# Patient Record
Sex: FEMALE | Race: OTHER | Employment: UNEMPLOYED | URBAN - METROPOLITAN AREA
[De-identification: names, ages, dates, MRNs, and addresses within clinical notes are randomized per-mention and may not be internally consistent; named-entity substitution may affect disease eponyms.]

---

## 2017-01-24 ENCOUNTER — HOSPITAL ENCOUNTER (OUTPATIENT)
Dept: LAB | Age: 37
Discharge: HOME | End: 2017-01-24
Attending: INTERNAL MEDICINE
Payer: MEDICAID

## 2017-01-24 DIAGNOSIS — E03.9 PRIMARY HYPOTHYROIDISM: ICD-10-CM

## 2017-01-24 PROBLEM — E04.1 THYROID NODULE: Status: ACTIVE | Noted: 2017-01-24

## 2017-01-24 LAB
T4 SERPL-MCNC: 11 UG/DL (ref 4.8–13.9)
TSH SERPL DL<=0.005 MIU/L-ACNC: 3.7 UIU/ML (ref 0.36–3.74)
TSH W FREE THYROID I,TSHELE: 3.7 UIU/ML

## 2017-01-24 PROCEDURE — 36415 COLL VENOUS BLD VENIPUNCTURE: CPT | Performed by: INTERNAL MEDICINE

## 2017-01-24 PROCEDURE — 86376 MICROSOMAL ANTIBODY EACH: CPT | Performed by: INTERNAL MEDICINE

## 2017-01-24 PROCEDURE — 84443 ASSAY THYROID STIM HORMONE: CPT | Performed by: INTERNAL MEDICINE

## 2017-01-24 PROCEDURE — 84436 ASSAY OF TOTAL THYROXINE: CPT | Performed by: INTERNAL MEDICINE

## 2017-01-25 LAB — THYROPEROXIDASE AB SERPL-ACNC: 37 IU/ML (ref 0–34)

## 2017-05-16 PROBLEM — R73.03 PREDIABETES: Status: ACTIVE | Noted: 2017-05-16

## 2017-05-16 PROBLEM — G47.33 OSA ON CPAP: Status: ACTIVE | Noted: 2017-05-16

## 2017-05-16 PROBLEM — Z99.89 OSA ON CPAP: Status: ACTIVE | Noted: 2017-05-16

## 2018-03-30 PROBLEM — E06.3 HASHIMOTO'S THYROIDITIS: Status: ACTIVE | Noted: 2018-03-30

## 2018-05-22 PROBLEM — E66.01 SEVERE OBESITY (BMI 35.0-39.9) WITH COMORBIDITY (HCC): Status: ACTIVE | Noted: 2018-05-22

## 2018-06-05 PROBLEM — Z01.419 WOMEN'S ANNUAL ROUTINE GYNECOLOGICAL EXAMINATION: Status: ACTIVE | Noted: 2018-06-05

## 2019-02-19 PROBLEM — Z30.09 CONTRACEPTIVE EDUCATION: Status: ACTIVE | Noted: 2019-02-19

## 2019-08-27 PROBLEM — Z30.09 CONTRACEPTIVE EDUCATION: Status: RESOLVED | Noted: 2019-02-19 | Resolved: 2019-08-27

## 2019-08-27 PROBLEM — Z30.42 DEPO-PROVERA CONTRACEPTIVE STATUS: Status: ACTIVE | Noted: 2019-08-27

## 2019-08-29 PROBLEM — Z12.4 PAP SMEAR FOR CERVICAL CANCER SCREENING: Status: ACTIVE | Noted: 2018-06-05

## 2020-06-16 ENCOUNTER — HOSPITAL ENCOUNTER (OUTPATIENT)
Dept: PHYSICAL THERAPY | Age: 40
Discharge: HOME | End: 2020-06-16
Attending: SURGERY
Payer: MEDICAID

## 2020-06-16 DIAGNOSIS — E78.2 MIXED HYPERLIPIDEMIA: ICD-10-CM

## 2020-06-16 DIAGNOSIS — E66.01 MORBID OBESITY DUE TO EXCESS CALORIES (HCC): ICD-10-CM

## 2020-06-16 DIAGNOSIS — G47.33 OSA (OBSTRUCTIVE SLEEP APNEA): ICD-10-CM

## 2020-06-16 PROCEDURE — 97161 PT EVAL LOW COMPLEX 20 MIN: CPT

## 2020-06-16 NOTE — THERAPY EVALUATION
Dennise Castrejonsheng  : 1980  Primary: [employer] Medicaid  Secondary:  [address]  at [facility] DONAVAN BASURTO  [facility], [address] [place] [address],[address], [facility] [address]  [phone]   [phone]       OUTPATIENT PHYSICAL [place] Assessment 2020     ICD-10: Treatment Diagnosis: Morbid obesity due to excess calories ([address] [E66.01]  Precautions/Allergies:   Patient has no known allergies. TREATMENT PLAN:  Effective Dates: 2020 TO Today (2020). Frequency/Duration: 1 visit for HEP MEDICAL/REFERRING DIAGNOSIS:  Morbid obesity due to excess calories ([address] [E66.01]  Mixed hyperlipidemia [E78.2]  MATI (obstructive sleep apnea) [G47.33]  BMI 35.0-35.9,adult [Z68.35]   DATE OF ONSET: chronic   REFERRING PHYSICIAN: Simi Le*  MD Orders: Referral to physical therapy  Return MD Appointment: unknown     INITIAL ASSESSMENT:  Ms. Silva Witt presents to physical therapy in preparation for bariatric surgery. She is currently riding her bike or walking 1-2 days/week. She states motivation is her main limitation to exercising. Discussed exercise options and came up with a plan for patient to start [place] body on demand with 21 fix program. Pt verbalizes understanding and importance of exercise. No further PT needs at this time. PROBLEM LIST (Impacting functional limitations):  1. Decreased Activity Tolerance INTERVENTIONS PLANNED: (Treatment may consist of any combination of the following)  1. Therapeutic Exercise/Strengthening     GOALS: (Goals have been discussed and agreed upon with patient.)  Discharge Goals: Time Frame: 1 visit  1. Independent with HEP. OUTCOME MEASURE:   Tool Used: Lower Extremity Functional Scale (LEFS)  Score:  Initial:  Most Recent:  (Date: -- )   Interpretation of Score: 20 questions each scored on a 5 point scale with 0 representing \"extreme difficulty or unable to perform\" and 4 representing \"no difficulty\".   The lower the score, the greater the functional disability. 80/80 represents no disability. Minimal detectable change is 9 points. Total Duration: 35 minutes  PT Patient Time In/Time Out  Time In: 1215  Time Out: 1250    Rehabilitation Potential For Stated Goals: Good  Regarding Dennise Block's therapy, I certify that the treatment plan above will be carried out by a therapist or under their direction. Thank you for this referral,    Isaiah Martin, PT      Referring Physician Signature: Simi Le Fee* _______________________________ Date _____________       PAIN/SUBJECTIVE:   Initial:   010 Post Session:  0/10   HISTORY:   History of Injury/Illness (Reason for Referral): Pt presents to therapy in preparation for bariatric surgery. She is currently riding her bike with her children 1-2x/week and walking 1x/week. She reports difficulty finding motivation to start an exercise routine. Past Medical History/Comorbidities:   Ms. Silva Witt  has a past medical history of Allergic rhinitis, Anxiety, Calculus of kidney, Chills, Depression, Diabetes ([address] GERD (gastroesophageal reflux disease), Headache, HSV-1 (herpes simplex virus 1) infection, Hyperlipidemia, Low energy, Menometrorrhagia, Mixed hyperlipidemia, Obesity, Obstructive sleep apnea on CPAP, MATI on CPAP (2017), Prediabetes (2017), Primary hypothyroidism, Sleep disorder, Snoring, and Urinary incontinence. Ms. Sliva Witt  has a past surgical history that includes hx  section (). Social History/Living Environment:    Lives with 3 children  Prior Level of Function/Work/Activity:  She is currently not working.  She takes care of her 3 kids, ages 13, 5 and 9   Ambulatory/Rehab Services H2 Model Falls Risk Assessment   Risk Factors:       No Risk Factors Identified Ability to Rise from Chair:       (0)  Ability to rise in a single movement   Falls Prevention Plan:       No modifications necessary   Total: (5 or greater = High Risk): 0   ©2010 [facility] [employer]. All Rights Reserved. Elsy Torres Patent #[id number]. Federal Law prohibits the replication, distribution or use without written permission from [facility] [place]   Current Medications:       Current Outpatient Medications:     medroxyPROGESTERone (DEPO-PROVERA) 150 mg/mL injection, 150 mg by IntraMUSCular route once., Disp: , Rfl:     pravastatin (PRAVACHOL) 80 mg tablet, Take 1 Tab by mouth nightly., Disp: 90 Tab, Rfl: 5    metFORMIN (GLUCOPHAGE) 500 mg tablet, Take 1 Tab by mouth two (2) times a day., Disp: 180 Tab, Rfl: 5    levothyroxine (SYNTHROID) 112 mcg tablet, Take 1 Tab by mouth Daily (before breakfast). , Disp: 90 Tab, Rfl: 5   Date Last Reviewed:  6-   Number of Personal Factors/Comorbidities that affect the Plan of Care: 1-2: MODERATE COMPLEXITY   EXAMINATION:   Observation/Orthostatic Postural Assessment: Pt presents to physical therapy in no apparent distress. ROM: shoulder and knee ROM WFL  Strength: UE and LE strength grossly 4+/5  Special Tests: none  Neurological Screen: n/t. Functional Mobility:  Sit to/from Stand: independent. Bed Mobility: independent. Walking on level ground: ambulates with a normalized gait. Body Structures Involved:  1. Joints  2. Muscles Body Functions Affected:  1. Neuromusculoskeletal Activities and Participation Affected:  1.  Community, Social and [facility]   Number of elements (examined above) that affect the Plan of Care: 3: MODERATE COMPLEXITY   CLINICAL PRESENTATION:   Presentation: Stable and uncomplicated: LOW COMPLEXITY   CLINICAL DECISION MAKING:   Use of outcome tool(s) and clinical judgement create a POC that gives a: Clear prediction of patient's progress: LOW COMPLEXITY

## 2020-06-29 ENCOUNTER — HOSPITAL ENCOUNTER (OUTPATIENT)
Dept: GENERAL RADIOLOGY | Age: 40
Discharge: HOME | End: 2020-06-29
Attending: SURGERY
Payer: MEDICAID

## 2020-06-29 DIAGNOSIS — E66.01 MORBID OBESITY DUE TO EXCESS CALORIES (HCC): ICD-10-CM

## 2020-06-29 PROCEDURE — 74011000250 HC RX REV CODE- 250: Performed by: SURGERY

## 2020-06-29 PROCEDURE — 74246 X-RAY XM UPR GI TRC 2CNTRST: CPT

## 2020-06-29 PROCEDURE — 74011000255 HC RX REV CODE- 255: Performed by: SURGERY

## 2020-06-29 RX ADMIN — ANTACID/ANTIFLATULENT 4 G: 380; 550; 10; 10 GRANULE, EFFERVESCENT ORAL at 09:40

## 2020-06-29 RX ADMIN — BARIUM SULFATE 135 ML: 980 POWDER, FOR SUSPENSION ORAL at 09:40

## 2020-06-29 RX ADMIN — BARIUM SULFATE 355 ML: 0.6 SUSPENSION ORAL at 09:44

## 2021-04-29 ENCOUNTER — HOSPITAL ENCOUNTER (OUTPATIENT)
Dept: LAB | Age: 41
Discharge: HOME | End: 2021-04-29
Payer: MEDICAID

## 2021-04-29 DIAGNOSIS — R63.5 WEIGHT GAIN: ICD-10-CM

## 2021-04-29 LAB — CORTIS BS SERPL-MCNC: 0.6 UG/DL

## 2021-04-29 PROCEDURE — 82542 COL CHROMOTOGRAPHY QUAL/QUAN: CPT

## 2021-04-29 PROCEDURE — 82533 TOTAL CORTISOL: CPT

## 2021-04-29 PROCEDURE — 36415 COLL VENOUS BLD VENIPUNCTURE: CPT

## 2021-05-12 LAB — DEXAMETHASONE SERPL-MCNC: 472 NG/DL

## 2021-06-04 ENCOUNTER — HOSPITAL ENCOUNTER (OUTPATIENT)
Dept: LAB | Age: 41
Discharge: HOME | End: 2021-06-04
Payer: MEDICAID

## 2021-06-04 DIAGNOSIS — E03.9 PRIMARY HYPOTHYROIDISM: ICD-10-CM

## 2021-06-04 LAB
T3 SERPL-MCNC: 1.02 NG/ML (ref 0.6–1.81)
T4 FREE SERPL-MCNC: 1.6 NG/DL (ref 0.78–1.46)
TSH W FREE THYROID I,TSHELE: 0.11 UIU/ML (ref 0.36–3.74)

## 2021-06-04 PROCEDURE — 84439 ASSAY OF FREE THYROXINE: CPT

## 2021-06-04 PROCEDURE — 84480 ASSAY TRIIODOTHYRONINE (T3): CPT

## 2021-06-04 PROCEDURE — 36415 COLL VENOUS BLD VENIPUNCTURE: CPT

## 2021-06-04 PROCEDURE — 84443 ASSAY THYROID STIM HORMONE: CPT

## 2021-08-06 PROBLEM — N39.3 SUI (STRESS URINARY INCONTINENCE, FEMALE): Status: ACTIVE | Noted: 2021-08-06

## 2022-03-18 PROBLEM — R73.03 PREDIABETES: Status: ACTIVE | Noted: 2017-05-16

## 2022-03-19 PROBLEM — Z30.42 DEPO-PROVERA CONTRACEPTIVE STATUS: Status: ACTIVE | Noted: 2019-08-27

## 2022-03-19 PROBLEM — Z01.419 ENCOUNTER FOR ANNUAL ROUTINE GYNECOLOGICAL EXAMINATION: Status: ACTIVE | Noted: 2018-06-05

## 2022-03-19 PROBLEM — E66.01 SEVERE OBESITY (BMI 35.0-39.9) WITH COMORBIDITY (HCC): Status: ACTIVE | Noted: 2018-05-22

## 2022-03-19 PROBLEM — Z99.89 OSA ON CPAP: Status: ACTIVE | Noted: 2017-05-16

## 2022-03-19 PROBLEM — G47.33 OSA ON CPAP: Status: ACTIVE | Noted: 2017-05-16

## 2022-03-20 PROBLEM — E04.1 THYROID NODULE: Status: ACTIVE | Noted: 2017-01-24

## 2022-03-20 PROBLEM — N39.3 SUI (STRESS URINARY INCONTINENCE, FEMALE): Status: ACTIVE | Noted: 2021-08-06

## 2022-03-20 PROBLEM — E06.3 HASHIMOTO'S THYROIDITIS: Status: ACTIVE | Noted: 2018-03-30

## 2023-01-03 ENCOUNTER — TRANSCRIBE ORDERS (OUTPATIENT)
Dept: SCHEDULING | Age: 43
End: 2023-01-03

## 2023-01-03 DIAGNOSIS — Z12.31 SCREENING MAMMOGRAM FOR HIGH-RISK PATIENT: Primary | ICD-10-CM

## 2023-02-06 ENCOUNTER — TELEPHONE (OUTPATIENT)
Dept: OBGYN CLINIC | Age: 43
End: 2023-02-06

## 2023-02-06 NOTE — TELEPHONE ENCOUNTER
Pt lvm stating she needs to cancel her appt because she could not get her depo from pharmacy. Called pt back, no answer, LVM. Pt needs an AE.

## 2023-02-07 RX ORDER — MEDROXYPROGESTERONE ACETATE 150 MG/ML
150 INJECTION, SUSPENSION INTRAMUSCULAR
Qty: 1 ML | Refills: 0 | Status: SHIPPED | OUTPATIENT
Start: 2023-02-07

## 2023-02-07 NOTE — TELEPHONE ENCOUNTER
Pt lvm stating she was returning a call. Called pt back. Pt stated she needs a depo. I stated she needs an AE and that we would send in one refill for her and then after her AE we can send in more. Pt voiced understanding appt scheduled 2/17.

## 2023-06-20 RX ORDER — MEDROXYPROGESTERONE ACETATE 150 MG/ML
INJECTION, SUSPENSION INTRAMUSCULAR
Qty: 1 ML | Refills: 0 | OUTPATIENT
Start: 2023-06-20

## 2023-09-29 ENCOUNTER — HOSPITAL ENCOUNTER (OUTPATIENT)
Dept: MAMMOGRAPHY | Age: 43
End: 2023-09-29
Attending: FAMILY MEDICINE
Payer: MEDICAID

## 2023-09-29 DIAGNOSIS — Z12.31 SCREENING MAMMOGRAM FOR HIGH-RISK PATIENT: ICD-10-CM

## 2023-09-29 PROCEDURE — 77067 SCR MAMMO BI INCL CAD: CPT

## 2023-10-16 ENCOUNTER — TELEPHONE (OUTPATIENT)
Dept: OBGYN CLINIC | Age: 43
End: 2023-10-16

## 2023-10-16 RX ORDER — MEDROXYPROGESTERONE ACETATE 150 MG/ML
INJECTION, SUSPENSION INTRAMUSCULAR
Qty: 1 ML | Refills: 0 | OUTPATIENT
Start: 2023-10-16

## 2023-10-16 NOTE — TELEPHONE ENCOUNTER
Pt called office stating she was returning a call. Called pt back, informed pt that we need to schedule an AE before sending in her depo since her last one was in February. Pt voiced understanding and appt scheduled.

## 2023-10-16 NOTE — TELEPHONE ENCOUNTER
Pt lvm stating she has an appt next week for a depo injection and needs a refill. Called pt back, no answer, LVM. PT WILL NOT GET ANOTHER DEPO REFILL UNTIL SHE HAS AN AE. Last AE was 2021.

## 2023-10-24 ENCOUNTER — OFFICE VISIT (OUTPATIENT)
Dept: OBGYN CLINIC | Age: 43
End: 2023-10-24

## 2023-10-24 ENCOUNTER — OFFICE VISIT (OUTPATIENT)
Dept: OBGYN CLINIC | Age: 43
End: 2023-10-24
Payer: MEDICAID

## 2023-10-24 ENCOUNTER — CLINICAL DOCUMENTATION (OUTPATIENT)
Dept: OBGYN CLINIC | Age: 43
End: 2023-10-24

## 2023-10-24 VITALS
BODY MASS INDEX: 35.12 KG/M2 | DIASTOLIC BLOOD PRESSURE: 72 MMHG | WEIGHT: 186 LBS | SYSTOLIC BLOOD PRESSURE: 110 MMHG | HEIGHT: 61 IN

## 2023-10-24 DIAGNOSIS — Z12.4 PAP SMEAR FOR CERVICAL CANCER SCREENING: Primary | ICD-10-CM

## 2023-10-24 DIAGNOSIS — Z30.42 ENCOUNTER FOR MANAGEMENT AND INJECTION OF DEPO-PROVERA: Primary | ICD-10-CM

## 2023-10-24 DIAGNOSIS — Z01.419 ENCOUNTER FOR ANNUAL ROUTINE GYNECOLOGICAL EXAMINATION: ICD-10-CM

## 2023-10-24 DIAGNOSIS — N92.1 MENORRHAGIA WITH IRREGULAR CYCLE: ICD-10-CM

## 2023-10-24 DIAGNOSIS — Z11.3 SCREEN FOR STD (SEXUALLY TRANSMITTED DISEASE): ICD-10-CM

## 2023-10-24 PROCEDURE — 99396 PREV VISIT EST AGE 40-64: CPT | Performed by: NURSE PRACTITIONER

## 2023-10-24 RX ORDER — MEDROXYPROGESTERONE ACETATE 150 MG/ML
150 INJECTION, SUSPENSION INTRAMUSCULAR
Qty: 1 ML | Refills: 3 | Status: SHIPPED | OUTPATIENT
Start: 2023-10-24 | End: 2023-10-24 | Stop reason: SDUPTHER

## 2023-10-24 RX ORDER — ATORVASTATIN CALCIUM 80 MG/1
80 TABLET, FILM COATED ORAL
COMMUNITY
Start: 2023-09-27

## 2023-10-24 RX ORDER — MEDROXYPROGESTERONE ACETATE 150 MG/ML
150 INJECTION, SUSPENSION INTRAMUSCULAR
Qty: 1 ML | Refills: 3 | Status: SHIPPED | OUTPATIENT
Start: 2023-10-24

## 2023-10-24 RX ORDER — NALTREXONE HYDROCHLORIDE AND BUPROPION HYDROCHLORIDE 8; 90 MG/1; MG/1
2 TABLET, EXTENDED RELEASE ORAL 2 TIMES DAILY
COMMUNITY

## 2023-10-24 RX ORDER — LEVOTHYROXINE SODIUM 137 UG/1
137 TABLET ORAL
COMMUNITY
Start: 2023-09-27

## 2023-10-24 SDOH — ECONOMIC STABILITY: HOUSING INSECURITY
IN THE LAST 12 MONTHS, WAS THERE A TIME WHEN YOU DID NOT HAVE A STEADY PLACE TO SLEEP OR SLEPT IN A SHELTER (INCLUDING NOW)?: NO

## 2023-10-24 SDOH — ECONOMIC STABILITY: FOOD INSECURITY: WITHIN THE PAST 12 MONTHS, YOU WORRIED THAT YOUR FOOD WOULD RUN OUT BEFORE YOU GOT MONEY TO BUY MORE.: NEVER TRUE

## 2023-10-24 SDOH — ECONOMIC STABILITY: INCOME INSECURITY: HOW HARD IS IT FOR YOU TO PAY FOR THE VERY BASICS LIKE FOOD, HOUSING, MEDICAL CARE, AND HEATING?: NOT HARD AT ALL

## 2023-10-24 SDOH — ECONOMIC STABILITY: FOOD INSECURITY: WITHIN THE PAST 12 MONTHS, THE FOOD YOU BOUGHT JUST DIDN'T LAST AND YOU DIDN'T HAVE MONEY TO GET MORE.: NEVER TRUE

## 2023-10-24 ASSESSMENT — PATIENT HEALTH QUESTIONNAIRE - PHQ9: DEPRESSION UNABLE TO ASSESS: FUNCTIONAL CAPACITY MOTIVATION LIMITS ACCURACY

## 2023-10-24 NOTE — PROGRESS NOTES
Patient rescheduled depo for tomorrow morning due to [facility] [address] not having depo ready in time for appointment today. Sending RX to patients preferred pharmacy for patient to  later today. Patient went next door for lab work.

## 2023-10-24 NOTE — PROGRESS NOTES
Patient rescheduled depo for tomorrow morning due to [facility] [address] not having depo ready in time for appointment today. Sending RX to patients preferred pharmacy for patient to  later today. Patient went next door for lab work.

## 2023-10-24 NOTE — PROGRESS NOTES
I have reviewed the patient's visit today including history, exam and assessment by COLTON HsiehBC. I agree with treatment/plan as above.     Emily Doherty MD  2:52 PM  10/24/23
Patient comes in today for annual exam. Patient would like to discuss depo. Patient states she has been on depo for many years. Patient states she had her last injection in November 2022. Patient states her period came back and had to have two units of blood Saturday. Patient states in October she has had 18 days of bleeding. LAST PAP:  08/06/2021, ASCUS HPV Negative    LAST MAMMO: 09/29/2023    LMP:  Patient's last menstrual period was 10/03/2023 (exact date).     BIRTH CONTROL:  none    TOBACCO USE:  No    FAMILY HISTORY OF:   Breast Cancer:  No   Ovarian Cancer:  No   Uterine Cancer:  No   Colon Cancer:  No    Vitals:    10/24/23 1110   BP: 110/72   Site: Left Upper Arm   Position: Sitting   Weight: 84.4 kg (186 lb)   Height: 1.549 m (5' 1\")        Bharti Arnold MA  10/24/23  11:24 AM
Objective:    Vitals:    10/24/23 1110   BP: 110/72   Site: Left Upper Arm   Position: Sitting   Weight: 84.4 kg (186 lb)   Height: 1.549 m (5' 1\")           Constitutional:  well-developed, well-nourished, and in no distress. Mental: Alert and awake. Oriented to person/place/time. Able to follow commands    Eyes: EOM nl, Sclera nl, Ocular Discharge not visualized    HENT: Normocephalic and atraumatic. Mouth/Throat: Mucous membranes are moist. External Ears: nl. No cervical code adneopathy    Neck: Normal range of motion. Neck supple. No thyromegaly present. Cardiovascular: Normal rate and regular rhythm. Pulmonary: Effort normal; No visualized signs of difficulty breathing or respiratory distress; breath sounds normal.    Breast: The breasts exhibit no masses, no skin changes and no tenderness. No axillary node adenopathy    Abdominal: Soft. There is no tenderness. There is no rebound. Pelvic Exam:       External: normal female genitalia without lesions or masses       Vagina: normal without lesions or masses, no discharge noted      Cervix: normal without lesions or masses     Uterus/Adnexa: difficult to palpate d/t body habitus      Musculoskeletal: Normal range of motion. Normal gait with no signs of ataxia     Neurological: No Facial Asymmetry (Cranial nerve 7 motor function); No gaze palsy; normal coordination, muscle strength and tone     Skin: Skin is warm and dry. No significant exanthematous lesions or discoloration noted on facial skin      Psych: Normal affect.  No hallucinations            Assessment/Plan            Patient Active Problem List    Diagnosis Date Noted    Menorrhagia with irregular cycle 10/24/2023     Assessment & Plan Note:     Last depo 11/2022, menses restarted March and irregular  Plan to restart depo  Repeat anemia labs, now s/p 2u PRBCs 10/21/2023 in ER          RICKIE (stress urinary incontinence, female) 08/06/2021    Primary hypothyroidism     Encounter

## 2023-10-24 NOTE — ASSESSMENT & PLAN NOTE
Last depo 11/2022, menses restarted March and irregular  Plan to restart depo  Repeat anemia labs, now s/p 2u PRBCs 10/21/2023 in ER

## 2023-10-25 DIAGNOSIS — D64.9 ANEMIA, UNSPECIFIED TYPE: Primary | ICD-10-CM

## 2023-10-25 RX ORDER — DOCUSATE SODIUM 100 MG/1
100 CAPSULE, LIQUID FILLED ORAL 2 TIMES DAILY PRN
Qty: 60 CAPSULE | Refills: 6 | Status: SHIPPED | OUTPATIENT
Start: 2023-10-25

## 2023-10-25 RX ORDER — FERROUS SULFATE 325(65) MG
325 TABLET ORAL 2 TIMES DAILY
Qty: 60 TABLET | Refills: 6 | Status: SHIPPED | OUTPATIENT
Start: 2023-10-25

## 2023-10-25 NOTE — TELEPHONE ENCOUNTER
Please let pt know Hgb improved following blood transfusion, now 10.0    Platelets and WBCs elevated, likely from recent anemia/transfusion. Recommend recheck with visit in November      She needs to start taking     FeSO4 325mg PO BID Disp: 60 RF:6  Colace 100mg PO BID Disp: 60 RF: 6 prn for constipation    Also encourage foods that are high in iron. Also increase fluids and foods high in fiber to prevent constipation.

## 2023-10-25 NOTE — TELEPHONE ENCOUNTER
Called pt, message below reviewed with pt, pt asked about her other results because she saw levels were high and low. Informed pt that some were borderline but her levels would not be how we want them due to her recent transfusion/anemia. Pt then stated she was told to not take ibuprofen but she had a headcahe so I advised her to take Tylenol. Pt voiced understanding.      RX pend

## 2023-11-01 LAB
CYTOLOGIST CVX/VAG CYTO: NORMAL
CYTOLOGY CVX/VAG DOC THIN PREP: NORMAL
HPV APTIMA: NEGATIVE
HPV GENOTYPE REFLEX: NORMAL
Lab: NORMAL
PATH REPORT.FINAL DX SPEC: NORMAL
STAT OF ADQ CVX/VAG CYTO-IMP: NORMAL

## 2023-11-28 ENCOUNTER — OFFICE VISIT (OUTPATIENT)
Dept: OBGYN CLINIC | Age: 43
End: 2023-11-28
Payer: MEDICAID

## 2023-11-28 VITALS
WEIGHT: 187 LBS | DIASTOLIC BLOOD PRESSURE: 68 MMHG | SYSTOLIC BLOOD PRESSURE: 124 MMHG | HEIGHT: 61 IN | BODY MASS INDEX: 35.3 KG/M2

## 2023-11-28 DIAGNOSIS — N92.1 MENORRHAGIA WITH IRREGULAR CYCLE: Primary | ICD-10-CM

## 2023-11-28 DIAGNOSIS — R73.03 PREDIABETES: ICD-10-CM

## 2023-11-28 DIAGNOSIS — R93.89 THICKENED ENDOMETRIUM: ICD-10-CM

## 2023-11-28 LAB
HCG, PREGNANCY, URINE, POC: NEGATIVE
VALID INTERNAL CONTROL, POC: YES

## 2023-11-28 PROCEDURE — 81025 URINE PREGNANCY TEST: CPT | Performed by: NURSE PRACTITIONER

## 2023-11-28 PROCEDURE — 99214 OFFICE O/P EST MOD 30 MIN: CPT | Performed by: NURSE PRACTITIONER

## 2023-11-28 PROCEDURE — 76830 TRANSVAGINAL US NON-OB: CPT | Performed by: NURSE PRACTITIONER

## 2023-11-28 ASSESSMENT — COLUMBIA-SUICIDE SEVERITY RATING SCALE - C-SSRS
5. HAVE YOU STARTED TO WORK OUT OR WORKED OUT THE DETAILS OF HOW TO KILL YOURSELF? DO YOU INTEND TO CARRY OUT THIS PLAN?: NO
7. DID THIS OCCUR IN THE LAST THREE MONTHS: NO
4. HAVE YOU HAD THESE THOUGHTS AND HAD SOME INTENTION OF ACTING ON THEM?: NO
3. HAVE YOU BEEN THINKING ABOUT HOW YOU MIGHT KILL YOURSELF?: NO

## 2023-11-28 ASSESSMENT — PATIENT HEALTH QUESTIONNAIRE - PHQ9
5. POOR APPETITE OR OVEREATING: 0
6. FEELING BAD ABOUT YOURSELF - OR THAT YOU ARE A FAILURE OR HAVE LET YOURSELF OR YOUR FAMILY DOWN: 0
4. FEELING TIRED OR HAVING LITTLE ENERGY: 1
1. LITTLE INTEREST OR PLEASURE IN DOING THINGS: 1
SUM OF ALL RESPONSES TO PHQ QUESTIONS 1-9: 3
SUM OF ALL RESPONSES TO PHQ9 QUESTIONS 1 & 2: 1
SUM OF ALL RESPONSES TO PHQ QUESTIONS 1-9: 3
2. FEELING DOWN, DEPRESSED OR HOPELESS: 0
9. THOUGHTS THAT YOU WOULD BE BETTER OFF DEAD, OR OF HURTING YOURSELF: 0
SUM OF ALL RESPONSES TO PHQ QUESTIONS 1-9: 3
3. TROUBLE FALLING OR STAYING ASLEEP: 1
7. TROUBLE CONCENTRATING ON THINGS, SUCH AS READING THE NEWSPAPER OR WATCHING TELEVISION: 0
8. MOVING OR SPEAKING SO SLOWLY THAT OTHER PEOPLE COULD HAVE NOTICED. OR THE OPPOSITE, BEING SO FIGETY OR RESTLESS THAT YOU HAVE BEEN MOVING AROUND A LOT MORE THAN USUAL: 0
10. IF YOU CHECKED OFF ANY PROBLEMS, HOW DIFFICULT HAVE THESE PROBLEMS MADE IT FOR YOU TO DO YOUR WORK, TAKE CARE OF THINGS AT HOME, OR GET ALONG WITH OTHER PEOPLE: 0
SUM OF ALL RESPONSES TO PHQ QUESTIONS 1-9: 3

## 2023-11-28 NOTE — PROGRESS NOTES
Patient here for gyn f/u with US. LAST PAP:  10/24/2023, neg., Hpv neg    LAST MAMMO:  9/29/23    LMP:  Patient's last menstrual period was 10/03/2023 (exact date).     BIRTH CONTROL:  depo injections    TOBACCO USE:  yes-occasional smoking    FAMILY HISTORY OF:   Breast Cancer:  No   Ovarian Cancer:  No   Uterine Cancer:  No   Colon Cancer:  No    Vitals:    11/28/23 1350   BP: 124/68   Site: Left Upper Arm   Position: Sitting   Weight: 84.8 kg (187 lb)   Height: 1.549 m (5' 1\")        Ryanne Andrews RN  11/28/23  1:59 PM

## 2023-11-28 NOTE — PROGRESS NOTES
Carlos Shepherd is a 44 y.o. [id number] who is here for US and followup    Pt seen 10/24/2023 with the following concerns  \"here today for annual exam, discuss restarting depo, and ER followup. Pt seen in ER 10/21/23 with c/o feeling like she was going to pass out. Hgb found to be 6.8 and pt received 2 u PRBCs. Previous long term depo - provera user. Last injection 2022. Pt reports menses returned 2023, very heavy and then began to have prolonged bleeding up to 18 days. No VB today  Labs reviewed from ER. TSH normal. CMP unremarkable\"    2023: pt here today for US. Reports VB has stopped since restarting depo at last visit      Patient's last menstrual period was 10/03/2023 (exact date).         Past Medical History:   Diagnosis Date    Allergic rhinitis     Anxiety     Calculus of kidney     Chills     Depression     Diabetes (HCC)     GERD (gastroesophageal reflux disease)     Headache     HSV-1 (herpes simplex virus 1) infection     Hyperlipidemia     Low energy     Menometrorrhagia     Mixed hyperlipidemia     Obesity     Obstructive sleep apnea on CPAP     MELINDA on CPAP 2017    Prediabetes 2017    Primary hypothyroidism     Sleep disorder     Snoring     Urinary incontinence        Past Surgical History:   Procedure Laterality Date     SECTION         Family History   Problem Relation Age of Onset    Uterine Cancer Neg Hx     Seizures Mother     Ovarian Cancer Neg Hx     Colon Cancer Neg Hx     Breast Cancer Neg Hx     Hypertension Father     Diabetes Paternal Aunt     Other Brother         down's syndrome    Thyroid Disease Brother         Hypothyroidism    Hypertension Mother        Social History     Socioeconomic History    Marital status:      Spouse name: Not on file    Number of children: Not on file    Years of education: Not on file    Highest education level: Not on file   Occupational History    Not on file   Tobacco Use    Smoking status: Some Days

## 2023-11-28 NOTE — ASSESSMENT & PLAN NOTE
10/24/2023: Last depo 11/2022, menses restarted March and irregular  Plan to restart depo  Repeat anemia labs, now s/p 2u PRBCs 10/21/2023 in ER    11/28/2023: US today uterus nl, ES thickened 2 2mm, ovaries nl  Endometrial bx today  Plan repeat US in 2 months with depo,  If bx normal  Repeat cbc, a1c today

## 2023-11-28 NOTE — PROGRESS NOTES
I have reviewed the patient's visit today including history, exam and assessment by LEIGHTON Cole. I agree with treatment/plan as above.     Quinton Majano MD  2:28 PM  11/28/23

## 2023-12-10 DIAGNOSIS — D64.9 ANEMIA, UNSPECIFIED TYPE: ICD-10-CM

## 2023-12-10 RX ORDER — FERROUS SULFATE 325(65) MG
325 TABLET ORAL 2 TIMES DAILY
Qty: 60 TABLET | Refills: 6 | Status: CANCELLED | OUTPATIENT
Start: 2023-12-10

## 2023-12-11 NOTE — TELEPHONE ENCOUNTER
Lisandra,     Patient is wondering if she needs to continue taking iron pills. Hemoglobin on 11/28/2023 was 12.6 g/dL. Recommendations?

## 2024-01-11 ENCOUNTER — TELEPHONE (OUTPATIENT)
Dept: OBGYN CLINIC | Age: 44
End: 2024-01-11

## 2024-01-11 NOTE — TELEPHONE ENCOUNTER
Patient called for an appointment. She was advised that we were scheduling out to March. Patient stated she wanted to be transferred up to the nurse.     I called patient back to advised her that she was scheduled for US and Lisandra but cancelled it after me asking several times. Patient stated she had an appointment with Liza but decided she did not want to go. Patient was requesting an appointment today or tomorrow. I advised the patient that unfortunately we only have one US tech and we would have to get her in with both. Patient advised that she could call back each day to see if there is a cancellation. Patient then asked \"what time do I call back?\" I advised her she can call back anytime of day to see if there is any cancellations. Patient voiced understanding. marietta